# Patient Record
Sex: MALE | Race: WHITE | Employment: FULL TIME | URBAN - NONMETROPOLITAN AREA
[De-identification: names, ages, dates, MRNs, and addresses within clinical notes are randomized per-mention and may not be internally consistent; named-entity substitution may affect disease eponyms.]

---

## 2023-01-29 ENCOUNTER — HOSPITAL ENCOUNTER (EMERGENCY)
Age: 38
Discharge: HOME | End: 2023-01-29
Attending: EMERGENCY MEDICINE
Payer: COMMERCIAL

## 2023-01-29 VITALS
WEIGHT: 270 LBS | TEMPERATURE: 98 F | DIASTOLIC BLOOD PRESSURE: 103 MMHG | OXYGEN SATURATION: 98 % | BODY MASS INDEX: 37.8 KG/M2 | SYSTOLIC BLOOD PRESSURE: 161 MMHG | RESPIRATION RATE: 19 BRPM | HEIGHT: 71 IN | HEART RATE: 88 BPM

## 2023-01-29 DIAGNOSIS — I10 ESSENTIAL HYPERTENSION: ICD-10-CM

## 2023-01-29 DIAGNOSIS — R42 DIZZINESS: Primary | ICD-10-CM

## 2023-01-29 LAB
A/G RATIO: 1.4 (ref 1.1–2.2)
ALBUMIN SERPL-MCNC: 4.3 G/DL (ref 3.4–5)
ALP BLD-CCNC: 104 U/L (ref 40–129)
ALT SERPL-CCNC: 22 U/L (ref 10–40)
ANION GAP SERPL CALCULATED.3IONS-SCNC: 13 MMOL/L (ref 3–16)
AST SERPL-CCNC: 16 U/L (ref 15–37)
BASOPHILS ABSOLUTE: 0.1 K/UL (ref 0–0.2)
BASOPHILS RELATIVE PERCENT: 1 %
BILIRUB SERPL-MCNC: <0.2 MG/DL (ref 0–1)
BUN BLDV-MCNC: 12 MG/DL (ref 7–20)
CALCIUM SERPL-MCNC: 9.6 MG/DL (ref 8.3–10.6)
CHLORIDE BLD-SCNC: 103 MMOL/L (ref 99–110)
CO2: 23 MMOL/L (ref 21–32)
CREAT SERPL-MCNC: 0.8 MG/DL (ref 0.9–1.3)
D DIMER: 0.37 UG/ML FEU (ref 0–0.6)
EOSINOPHILS ABSOLUTE: 0.2 K/UL (ref 0–0.6)
EOSINOPHILS RELATIVE PERCENT: 1.8 %
GFR SERPL CREATININE-BSD FRML MDRD: >60 ML/MIN/{1.73_M2}
GLUCOSE BLD-MCNC: 149 MG/DL (ref 70–99)
HCT VFR BLD CALC: 40.7 % (ref 40.5–52.5)
HEMOGLOBIN: 14.6 G/DL (ref 13.5–17.5)
LYMPHOCYTES ABSOLUTE: 3.2 K/UL (ref 1–5.1)
LYMPHOCYTES RELATIVE PERCENT: 37.8 %
MCH RBC QN AUTO: 29.8 PG (ref 26–34)
MCHC RBC AUTO-ENTMCNC: 35.8 G/DL (ref 31–36)
MCV RBC AUTO: 83.4 FL (ref 80–100)
MONOCYTES ABSOLUTE: 0.7 K/UL (ref 0–1.3)
MONOCYTES RELATIVE PERCENT: 7.7 %
NEUTROPHILS ABSOLUTE: 4.4 K/UL (ref 1.7–7.7)
NEUTROPHILS RELATIVE PERCENT: 51.7 %
PDW BLD-RTO: 12.6 % (ref 12.4–15.4)
PLATELET # BLD: 302 K/UL (ref 135–450)
PMV BLD AUTO: 8 FL (ref 5–10.5)
POTASSIUM REFLEX MAGNESIUM: 3.7 MMOL/L (ref 3.5–5.1)
RBC # BLD: 4.89 M/UL (ref 4.2–5.9)
SODIUM BLD-SCNC: 139 MMOL/L (ref 136–145)
TOTAL PROTEIN: 7.4 G/DL (ref 6.4–8.2)
TROPONIN: <0.01 NG/ML
WBC # BLD: 8.6 K/UL (ref 4–11)

## 2023-01-29 PROCEDURE — 99284 EMERGENCY DEPT VISIT MOD MDM: CPT

## 2023-01-29 PROCEDURE — 80053 COMPREHEN METABOLIC PANEL: CPT

## 2023-01-29 PROCEDURE — 84484 ASSAY OF TROPONIN QUANT: CPT

## 2023-01-29 PROCEDURE — 36415 COLL VENOUS BLD VENIPUNCTURE: CPT

## 2023-01-29 PROCEDURE — 85379 FIBRIN DEGRADATION QUANT: CPT

## 2023-01-29 PROCEDURE — 93005 ELECTROCARDIOGRAM TRACING: CPT | Performed by: EMERGENCY MEDICINE

## 2023-01-29 PROCEDURE — 85025 COMPLETE CBC W/AUTO DIFF WBC: CPT

## 2023-01-29 RX ORDER — AMLODIPINE BESYLATE 10 MG/1
TABLET ORAL
COMMUNITY
Start: 2023-01-10

## 2023-01-29 RX ORDER — LOSARTAN POTASSIUM 100 MG/1
TABLET ORAL
COMMUNITY
Start: 2023-01-10

## 2023-01-29 ASSESSMENT — LIFESTYLE VARIABLES
HOW OFTEN DO YOU HAVE A DRINK CONTAINING ALCOHOL: NEVER
HOW MANY STANDARD DRINKS CONTAINING ALCOHOL DO YOU HAVE ON A TYPICAL DAY: PATIENT DOES NOT DRINK
HOW OFTEN DO YOU HAVE A DRINK CONTAINING ALCOHOL: NEVER

## 2023-01-29 ASSESSMENT — PAIN - FUNCTIONAL ASSESSMENT: PAIN_FUNCTIONAL_ASSESSMENT: NONE - DENIES PAIN

## 2023-01-30 ENCOUNTER — APPOINTMENT (OUTPATIENT)
Dept: GENERAL RADIOLOGY | Age: 38
End: 2023-01-30
Payer: COMMERCIAL

## 2023-01-30 ENCOUNTER — HOSPITAL ENCOUNTER (EMERGENCY)
Age: 38
Discharge: HOME | End: 2023-01-30
Attending: STUDENT IN AN ORGANIZED HEALTH CARE EDUCATION/TRAINING PROGRAM
Payer: COMMERCIAL

## 2023-01-30 VITALS
HEIGHT: 71 IN | OXYGEN SATURATION: 95 % | SYSTOLIC BLOOD PRESSURE: 140 MMHG | DIASTOLIC BLOOD PRESSURE: 91 MMHG | WEIGHT: 270 LBS | TEMPERATURE: 98.2 F | BODY MASS INDEX: 37.8 KG/M2 | RESPIRATION RATE: 20 BRPM | HEART RATE: 80 BPM

## 2023-01-30 DIAGNOSIS — M54.12 CERVICAL RADICULOPATHY: Primary | ICD-10-CM

## 2023-01-30 DIAGNOSIS — R20.2 TINGLING: ICD-10-CM

## 2023-01-30 LAB
EKG ATRIAL RATE: 85 BPM
EKG ATRIAL RATE: 89 BPM
EKG DIAGNOSIS: NORMAL
EKG DIAGNOSIS: NORMAL
EKG P AXIS: 57 DEGREES
EKG P AXIS: 68 DEGREES
EKG P-R INTERVAL: 144 MS
EKG P-R INTERVAL: 152 MS
EKG Q-T INTERVAL: 360 MS
EKG Q-T INTERVAL: 366 MS
EKG QRS DURATION: 94 MS
EKG QRS DURATION: 96 MS
EKG QTC CALCULATION (BAZETT): 435 MS
EKG QTC CALCULATION (BAZETT): 438 MS
EKG R AXIS: 53 DEGREES
EKG R AXIS: 72 DEGREES
EKG T AXIS: 13 DEGREES
EKG T AXIS: 17 DEGREES
EKG VENTRICULAR RATE: 85 BPM
EKG VENTRICULAR RATE: 89 BPM

## 2023-01-30 PROCEDURE — 99284 EMERGENCY DEPT VISIT MOD MDM: CPT

## 2023-01-30 PROCEDURE — 93010 ELECTROCARDIOGRAM REPORT: CPT | Performed by: INTERNAL MEDICINE

## 2023-01-30 PROCEDURE — 72040 X-RAY EXAM NECK SPINE 2-3 VW: CPT

## 2023-01-30 PROCEDURE — 6360000002 HC RX W HCPCS: Performed by: STUDENT IN AN ORGANIZED HEALTH CARE EDUCATION/TRAINING PROGRAM

## 2023-01-30 PROCEDURE — 96372 THER/PROPH/DIAG INJ SC/IM: CPT

## 2023-01-30 RX ORDER — METHYLPREDNISOLONE 4 MG/1
TABLET ORAL
Qty: 1 KIT | Refills: 0 | Status: SHIPPED | OUTPATIENT
Start: 2023-01-30 | End: 2023-02-05

## 2023-01-30 RX ORDER — DEXAMETHASONE SODIUM PHOSPHATE 4 MG/ML
10 INJECTION, SOLUTION INTRA-ARTICULAR; INTRALESIONAL; INTRAMUSCULAR; INTRAVENOUS; SOFT TISSUE ONCE
Status: COMPLETED | OUTPATIENT
Start: 2023-01-30 | End: 2023-01-30

## 2023-01-30 RX ORDER — METHOCARBAMOL 500 MG/1
500 TABLET, FILM COATED ORAL 3 TIMES DAILY
Qty: 12 TABLET | Refills: 0 | Status: SHIPPED | OUTPATIENT
Start: 2023-01-30 | End: 2023-02-03

## 2023-01-30 RX ADMIN — DEXAMETHASONE SODIUM PHOSPHATE 10 MG: 4 INJECTION, SOLUTION INTRAMUSCULAR; INTRAVENOUS at 14:52

## 2023-01-30 ASSESSMENT — PAIN SCALES - GENERAL: PAINLEVEL_OUTOF10: 2

## 2023-01-30 ASSESSMENT — PAIN DESCRIPTION - ORIENTATION: ORIENTATION: LEFT

## 2023-01-30 ASSESSMENT — PAIN DESCRIPTION - DESCRIPTORS: DESCRIPTORS: NUMBNESS

## 2023-01-30 ASSESSMENT — PAIN DESCRIPTION - LOCATION: LOCATION: NECK

## 2023-01-30 ASSESSMENT — PAIN - FUNCTIONAL ASSESSMENT: PAIN_FUNCTIONAL_ASSESSMENT: 0-10

## 2023-01-30 NOTE — DISCHARGE INSTRUCTIONS
You were seen in the emergency department for neck pain, numbness and tingling. Fortunately your x-ray was normal.  I feel that your symptoms may be due to a pinched nerve. A referral for a PCP has been placed. Please return to the emergency department with any new or concerning changes to your health. We hope for better soon!

## 2023-01-30 NOTE — ED PROVIDER NOTES
Emergency Department Provider Note  Location: [address] [place] [address]  ED  1/30/2023     Patient Identification  Sveta Malin is a 40 y.o. male    Chief Complaint  Tingling (Patient reporting a tingling sensation in his left arm radiating into his neck on the left side. Pt reports he went to [facility] and had blood work and an EKG. Told to follow up with PCP (has an appt tomorrow morning) pt reports he wanted a second opinion due to the tingling sensation. Pt reports it started after painting )      Mode of Arrival  private car    HPI  (History provided by patient)  This is a 40 y.o. male with a PMH significant for HTN  presented today for left arm tingling. Patient reports that symptoms started yesterday evening. He was actually seen at [facility] emergency department last night or work-up was negative. Patient reports that symptoms not improved. He tried taking 2 Tylenol this morning with no relief. States that he gets numbness and tingling in his fingertips. Pain radiates from hand up to his left side of the neck. He denies any significant weakness. Denies any issues with  strength. 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 Denies any chest pain or shortness of breath. Denies any recent fever, cough, congestion, vomiting, abdominal mood changes of bowel or bladder, syncope, or speech difficulties. He denies any injury. Patient does report that prior to onset of symptoms he was helping parents paint ceiling and wall. ROS  Review of Systems   All other systems reviewed and are negative. I have reviewed the following nursing documentation:  Allergies: No Known Allergies    Past medical history:  has a past medical history of Hypertension. Past surgical history:  has no past surgical history on file. Home medications:   Prior to Admission medications    Medication Sig Start Date End Date Taking?  Authorizing Provider   methylPREDNISolone (MEDROL DOSEPACK) 4 MG tablet Take by mouth. 1/30/23 2/5/23 Yes Soniya Gutierrez MD   methocarbamol (ROBAXIN) 500 MG tablet Take 1 tablet by mouth 3 times daily for 4 days WARNING:  May cause drowsiness. May impair ability to operate vehicles or machinery. Do not use in combination with alcohol. 1/30/23 2/3/23 Yes Soniya Gutierrez MD   amLODIPine (NORVASC) 10 MG tablet  1/10/23   Historical Provider, MD   losartan (COZAAR) 100 MG tablet  1/10/23   Historical Provider, MD       Social history:  reports that he has quit smoking. His smoking use included cigarettes. He smoked an average of 1 pack per day. He does not have any smokeless tobacco history on file. He reports that he does not currently use alcohol. He reports that he does not use drugs. Family history:  No family history on file. Exam  ED Triage Vitals   BP Temp Temp src Pulse Resp SpO2 Height Weight   -- -- -- -- -- -- -- --       Physical Exam  Vitals and nursing note reviewed. Constitutional:       General: He is not in acute distress. HENT:      Head: Normocephalic and atraumatic. Right Ear: External ear normal.      Left Ear: External ear normal.      Nose: Nose normal.      Mouth/Throat:      Pharynx: Oropharynx is clear. Eyes:      Extraocular Movements: Extraocular movements intact. Conjunctiva/sclera: Conjunctivae normal.   Neck:      Comments: Spurling positive  Cardiovascular:      Rate and Rhythm: Normal rate and regular rhythm. Pulses: Normal pulses. Heart sounds: Normal heart sounds. Pulmonary:      Effort: Pulmonary effort is normal. No respiratory distress. Breath sounds: Normal breath sounds. Abdominal:      General: There is no distension. Palpations: Abdomen is soft. Tenderness: There is no abdominal tenderness. There is no right CVA tenderness, left CVA tenderness, guarding or rebound. Musculoskeletal:         General: Normal range of motion. Cervical back: Normal range of motion and neck supple. Comments: Reproducible symptoms with phalen. Negative tinel   Skin:     General: Skin is warm.      Capillary Refill: Capillary refill takes less than 2 seconds.      Coloration: Skin is not jaundiced.   Neurological:      General: No focal deficit present.      Mental Status: He is alert.      Cranial Nerves: No cranial nerve deficit.   Psychiatric:         Mood and Affect: Mood normal.         Behavior: Behavior normal.         MDM/ED Course  ED Medication Orders (From admission, onward)      Start Ordered     Status Ordering Provider    01/30/23 1430 01/30/23 1417  Dexamethasone Sodium Phosphate injection 10 mg  ONCE         Last MAR action: Given - by JAMAR LOPEZ on 01/30/23 at 1452 CAPRICE SANDOVAL                Radiology  XR CERVICAL SPINE (2-3 VIEWS)    Result Date: 1/30/2023  EXAMINATION: 3 XRAY VIEWS OF THE CERVICAL SPINE 1/30/2023 2:18 pm COMPARISON: None. HISTORY: ORDERING SYSTEM PROVIDED HISTORY: radiculopathy symptoms TECHNOLOGIST PROVIDED HISTORY: Reason for exam:->radiculopathy symptoms Reason for Exam: tingling down left side of neck into arm FINDINGS: The lateral masses of C1 and C2 are normally aligned.  The cervical spine is not visualized below the C6-7 level on the lateral view.  The prevertebral soft tissues are within normal limits.     Unremarkable exam. The cervicothoracic junction is not well evaluated.        Labs  No results found for this visit on 01/30/23.      - Patient seen and evaluated in room T2.  37 y.o. male presented for left-sided neck pain, numbness and tingling.  Patient presented hypertensive, afebrile, heart rate of 70, respiratory rate of 17 and satting at 90% on room air.  On exam positive Spurling's test and Phalen's.  Given history and exam I suspect presentation secondary to cervical radiculopathy and possible underlying carpal tunnel.  He has no significant weakness and no focal neurologic deficits to suggest CVA or dissection.  At [facility] yesterday work-up  negative for any underlying ACS or acute cardiac abnormality. I obtained imaging as noted below. - Patient was placed on telemetry during his ED stay and no malignant dysrhythmia observed. - Pertinent old records reviewed. -Patient has no PCP follow-up  -Chronic medical condition addressed at this visit is hypertension.  - Patient was given 10mg IM decadron in the ED. Upon reassessment, patient remained hemodynamically stable. - Diagnostic studies reviewed and interpreted by me   -X-ray of the cervical spine with normal alignment. No significant abnormalities    - I discussed the results with patient. We agreed to discharge home with PCP follow-up. Again patient with likely acute cervical radiculopathy and underlying carpal tunnel. Recommend that he follow-up with his PCP. He was provided prescription for Medrol Dosepak in addition to 500 mg of Robaxin 3 times daily as needed for muscle spasm.    - Return precautions also discussed. patient verbalized understanding of care plan and agreed to follow-up with PCP as advised. Clinical Impression:  1. Cervical radiculopathy    2. Tingling          Disposition:  Discharge to home in good condition. Blood pressure (!) 147/99, pulse 70, temperature 98.2 °F (36.8 °C), temperature source Oral, resp. rate 17, height 5' 11\" (1.803 m), weight 270 lb (122.5 kg), SpO2 98 %. Patient was given scripts for the following medications. I counseled patient how to take these medications. New Prescriptions    METHOCARBAMOL (ROBAXIN) 500 MG TABLET    Take 1 tablet by mouth 3 times daily for 4 days WARNING:  May cause drowsiness. May impair ability to operate vehicles or machinery. Do not use in combination with alcohol. METHYLPREDNISOLONE (MEDROL DOSEPACK) 4 MG TABLET    Take by mouth.        Disposition referral (if applicable):  [address] [facility]  [phone]              This chart was generated in part by using Dragon Dictation system and may contain errors related to that system including errors in grammar, punctuation, and spelling, as well as words and phrases that may be inappropriate. If there are any questions or concerns please feel free to contact the dictating provider for clarification.      Yfn Mueller MD  [address] [place] [address]        Yfn Mueller MD  01/30/23 0044

## 2023-01-30 NOTE — ED PROVIDER NOTES
[facility] Name: Jenniffer Snyder  MRN: [id number]  [place] 1985  Date of evaluation: 1/29/2023  Provider: Beronica Seymour MD  PCP: No primary care provider on file. Note Started: 10:13 PM EST 1/29/23    CHIEF COMPLAINT       Chief Complaint   Patient presents with    Anxiety       HISTORY OF PRESENT ILLNESS: 1 or more Elements     History from : Patient and EMS    Limitations to history : None    Jenniffer Snyder is a 40 y.o. male who presents to the emergency department with dizziness. He states he was sitting on the couch watching the [employer]01 [employer] game when he started feeling dizzy. He noticed some tingling going down both of his arms at that time. He denies having chest pain or shortness of breath. He denies blurry vision or that the room was spinning. He checked his blood pressure and it was elevated. He has a history of high blood pressure and takes two blood pressure medications. He denies diabetes, HLN, smoking, CAD history of family CAD history. He currently has no symptoms    Nursing Notes were all reviewed and agreed with or any disagreements were addressed in the HPI. REVIEW OF SYSTEMS :      Review of Systems    10 systems reviewed and negative except as in HPI/MDM    SURGICAL HISTORY   History reviewed. No pertinent surgical history. [address]       Discharge Medication List as of 1/29/2023 11:25 PM        CONTINUE these medications which have NOT CHANGED    Details   amLODIPine (NORVASC) 10 MG tablet Historical Med      losartan (COZAAR) 100 MG tablet Historical Med             ALLERGIES     Patient has no known allergies. FAMILYHISTORY     History reviewed. No pertinent family history.      SOCIAL HISTORY       Social History     Tobacco Use    Smoking status: Former     Packs/day: 1.00     Types: Cigarettes   Vaping Use    Vaping Use: Never used   Substance Use Topics    Alcohol use: Not Currently    Drug use: No       SCREENINGS                         CIWA Assessment  BP: (!) 161/103  Heart Rate: 88           PHYSICAL EXAM  1 or more Elements     ED Triage Vitals [23 2206]   BP Temp Temp Source Heart Rate Resp SpO2 Height Weight   (!) 165/110 98 °F (36.7 °C) Oral 97 14 100 % 5' 11\" (1.803 m) 270 lb (122.5 kg)       Physical Exam    My pulse oximetry interpretation is which is within the normal range    GENERAL APPEARANCE: Awake and alert. Cooperative. No acute distress. HEAD:  Atraumatic. EYES: EOM's grossly intact. ENT: Mucous membranes are moist.  No trismus. NECK:  Trachea midline. HEART: Regular rate and rhythm  LUNGS: Respirations unlabored. CTAB  ABDOMEN: Soft. Non-tender. No guarding or rebound. EXTREMITIES: No acute deformities. SKIN: Warm and dry. NEUROLOGICAL: Moves all 4 extremities spontaneously. PSYCHIATRIC: Normal mood. DIAGNOSTIC RESULTS   LABS:    Labs Reviewed   COMPREHENSIVE METABOLIC PANEL W/ REFLEX TO MG FOR LOW K - Abnormal; Notable for the following components:       Result Value    Glucose 149 (*)     Creatinine 0.8 (*)     All other components within normal limits   CBC WITH AUTO DIFFERENTIAL   TROPONIN   D-DIMER, QUANTITATIVE       When ordered only abnormal lab results are displayed. All other labs were within normal range or not returned as of this dictation. EK lead EKG:  Normal Sinus Rhythm 85  Axis is   Normal  QTc is  normal  There is no specific ST-T wave changes appreciated. There is no clear evidence of acute ischemia or infarction. It was compared against an EKG from none. RADIOLOGY:   Non-plain film images such as CT, Ultrasound and MRI are read by the radiologist. Plain radiographic images are visualized and preliminarily interpreted by the ED Provider with the below findings:        Interpretation per the Radiologist below, if available at the time of this note:    No orders to display     No results found.     No results found.    PROCEDURES   Unless otherwise noted below, none     Procedures    CRITICAL CARE TIME       EMERGENCY DEPARTMENT COURSE and DIFFERENTIAL DIAGNOSIS/MDM:   Vitals:    Vitals:    01/29/23 2206 01/29/23 2319   BP: (!) 165/110 (!) 161/103   Pulse: 97 88   Resp: 14 19   Temp: 98 °F (36.7 °C)    TempSrc: Oral    SpO2: 100% 98%   Weight: 270 lb (122.5 kg)    Height: 5' 11\" (1.803 m)        Patient was given the following medications:  Medications - No data to display        [address]      has a past medical history of Hypertension. CC/HPI Summary, DDx, ED Course, and Reassessment: Anna Barber is a 40 y.o. male who presents to the emergency department with dizziness. He states he was sitting on the couch watching the [employer] game when he started feeling dizzy. He noticed some tingling going down both of his arms at that time. He denies having chest pain or shortness of breath. He denies blurry vision or that the room was spinning. He checked his blood pressure and it was elevated. He has a history of high blood pressure and takes two blood pressure medications. He denies diabetes, HLN, smoking, CAD history of family CAD history. He currently has no symptoms    Patient's vital signs are stable. He is awake alert and in no acute distress. He is hypertensive however he has a history of hypertension and takes 2 blood pressure medications for this. His CBC and chemistry and troponin are unremarkable. D-dimer is negative. He is not actually having any chest pain or shortness of breath therefore I did not believe a chest x-ray is warranted. He also has a nonfocal neurologic exam therefore I do not believe CT head is needed though I did consider this. I have low suspicion for CAD, PE, aortic dissection, CVA. Patient is actually not had any symptoms while in the emergency department. Repeat EKG also normal.  I believe he is safe for discharge home at this time.   Significant other at bedside. He will follow-up with his primary care physician    I am the Primary Clinician of Record. FINAL IMPRESSION      1. Dizziness    2.  Essential hypertension          DISPOSITION/PLAN     DISPOSITION Decision To Discharge 01/29/2023 11:22:31 PM      PATIENT REFERRED TO:  [facility] Emergency Department  [address] [place] [address]  [phone]    If symptoms worsen    DISCHARGE MEDICATIONS:  Discharge Medication List as of 1/29/2023 11:25 PM          DISCONTINUED MEDICATIONS:  Discharge Medication List as of 1/29/2023 11:25 PM                 (Please note that portions of this note were completed with a voice recognition program.  Efforts were made to edit the dictations but occasionally words are mis-transcribed.)    Fabiana Vasquez MD (electronically signed)            Roni Sol MD  01/30/23 9427

## 2023-01-30 NOTE — DISCHARGE INSTRUCTIONS
Please call your primary care physician and follow up in one week for reevaluation and recheck of your blood pressure